# Patient Record
Sex: FEMALE | Race: ASIAN | NOT HISPANIC OR LATINO | URBAN - METROPOLITAN AREA
[De-identification: names, ages, dates, MRNs, and addresses within clinical notes are randomized per-mention and may not be internally consistent; named-entity substitution may affect disease eponyms.]

---

## 2017-04-25 ENCOUNTER — EMERGENCY (EMERGENCY)
Facility: HOSPITAL | Age: 42
LOS: 1 days | Discharge: HOME | End: 2017-04-25
Attending: EMERGENCY MEDICINE | Admitting: EMERGENCY MEDICINE
Payer: COMMERCIAL

## 2017-04-25 VITALS
RESPIRATION RATE: 18 BRPM | OXYGEN SATURATION: 95 % | DIASTOLIC BLOOD PRESSURE: 57 MMHG | HEART RATE: 82 BPM | TEMPERATURE: 98 F | SYSTOLIC BLOOD PRESSURE: 106 MMHG

## 2017-04-25 VITALS
DIASTOLIC BLOOD PRESSURE: 74 MMHG | HEART RATE: 88 BPM | SYSTOLIC BLOOD PRESSURE: 113 MMHG | RESPIRATION RATE: 18 BRPM | OXYGEN SATURATION: 97 % | TEMPERATURE: 98 F

## 2017-04-25 DIAGNOSIS — R41.82 ALTERED MENTAL STATUS, UNSPECIFIED: ICD-10-CM

## 2017-04-25 DIAGNOSIS — F10.129 ALCOHOL ABUSE WITH INTOXICATION, UNSPECIFIED: ICD-10-CM

## 2017-04-25 PROCEDURE — 99283 EMERGENCY DEPT VISIT LOW MDM: CPT | Mod: 25

## 2017-04-25 PROCEDURE — 99285 EMERGENCY DEPT VISIT HI MDM: CPT

## 2017-04-25 NOTE — ED PROVIDER NOTE - MEDICAL DECISION MAKING DETAILS
Pt presents w/ alcohol intoxication. Pt vomited prior to ED arrival. No continued n/v in the ED. Pt offered IVF, antiemetics, declined. FS 85. No signs of trauma. Will observe for sobriety. Anticipate d/c when clinically sober.

## 2017-04-25 NOTE — ED PROVIDER NOTE - OBJECTIVE STATEMENT
Pt with no sig PMHx BIBA from home s/p her  called EMS. Pt and  both report the are going through a divorce. Pt reports she was upset about it and had approx 3 drinks of vodka. Pt vomited x 2. Pt reports mild continued nausea. No abdominal pain, fever, diarrhea, or urinary complaints. Pt denies SI, HI, AH / VH. Pt denies ingestion of drugs, pills, or other substances. No trauma. No physical complaints.

## 2017-04-25 NOTE — ED PROVIDER NOTE - PROGRESS NOTE DETAILS
Pt awake, alert, with clear speech. PO challenging, rpt vitals. Pt in c/o  who will take her home. Anticipate d/c. Pt w/ steady gait

## 2020-01-27 NOTE — ED ADULT NURSE NOTE - NS ED NURSE DC INFO COMPLEXITY
[FreeTextEntry1] :  currently seeing orthopedics for neck pain. She was diagnosed as cervical spondylosis and currently undergoing physical therapy with good relief.\par past medical history significant for hypothyroid with thyroid nodules for which she sees endocrinology, anemia and hyperlipidemia, depression for which she is on Paxil and is stable.\par blood and urine collected for complete physical profile.\par Patient declined STD testing.\par She is up-to-date on mammography and Paps.\par Colonoscopy done 5 years ago which was normal.\par stool fecal occult blood ordered.\par Continue with atorvastatin for hyperlipidemia.\par Continue with Paxil for depression.\par Continue with levothyroxine for hypothyroid--endocrinology appointment next month.\par \par Patient to continue maintaining regular exercise, healthy eating and keep well hydrated.\par Followup results 3 days.
Simple: Patient demonstrates quick and easy understanding

## 2025-05-16 NOTE — ED PROVIDER NOTE - CPE EDP GASTRO NORM
LOV 3/7 with Dr Mathur   #1 GERD changed to pantoprazole   epigastric discomfort check liver ultrasound gallbladder   US- hemangioma.  Had EGD done in December.       Dr Mathur, ok to order food allergy testing? Or should see GI?  Next OV with you 6/17.  
Last read by Sonal Rodriguez at 11:22AM on 5/10/2025.   
Left message on voicemail for patient to call back. [facility] [employer].   
normal...